# Patient Record
Sex: MALE | Race: WHITE
[De-identification: names, ages, dates, MRNs, and addresses within clinical notes are randomized per-mention and may not be internally consistent; named-entity substitution may affect disease eponyms.]

---

## 2018-10-04 ENCOUNTER — HOSPITAL ENCOUNTER (OUTPATIENT)
Dept: HOSPITAL 92 - LABBT | Age: 53
Discharge: HOME | End: 2018-10-04
Attending: ORTHOPAEDIC SURGERY
Payer: COMMERCIAL

## 2018-10-04 DIAGNOSIS — G56.01: ICD-10-CM

## 2018-10-04 DIAGNOSIS — G56.21: ICD-10-CM

## 2018-10-04 DIAGNOSIS — Z01.818: Primary | ICD-10-CM

## 2018-10-04 LAB
ANION GAP SERPL CALC-SCNC: 12 MMOL/L (ref 10–20)
BASOPHILS # BLD AUTO: 0.1 THOU/UL (ref 0–0.2)
BASOPHILS NFR BLD AUTO: 0.9 % (ref 0–1)
BUN SERPL-MCNC: 21 MG/DL (ref 8.4–25.7)
CALCIUM SERPL-MCNC: 10 MG/DL (ref 7.8–10.44)
CHLORIDE SERPL-SCNC: 104 MMOL/L (ref 98–107)
CO2 SERPL-SCNC: 26 MMOL/L (ref 22–29)
CREAT CL PREDICTED SERPL C-G-VRATE: 0 ML/MIN (ref 70–130)
EOSINOPHIL # BLD AUTO: 0.3 THOU/UL (ref 0–0.7)
EOSINOPHIL NFR BLD AUTO: 4.4 % (ref 0–10)
GLUCOSE SERPL-MCNC: 103 MG/DL (ref 70–105)
HGB BLD-MCNC: 17.4 G/DL (ref 14–18)
LYMPHOCYTES # BLD: 2.4 THOU/UL (ref 1.2–3.4)
LYMPHOCYTES NFR BLD AUTO: 30.6 % (ref 21–51)
MCH RBC QN AUTO: 29.8 PG (ref 27–31)
MCV RBC AUTO: 90.9 FL (ref 78–98)
MONOCYTES # BLD AUTO: 0.8 THOU/UL (ref 0.11–0.59)
MONOCYTES NFR BLD AUTO: 9.8 % (ref 0–10)
NEUTROPHILS # BLD AUTO: 4.3 THOU/UL (ref 1.4–6.5)
NEUTROPHILS NFR BLD AUTO: 54.2 % (ref 42–75)
PLATELET # BLD AUTO: 219 THOU/UL (ref 130–400)
POTASSIUM SERPL-SCNC: 3.8 MMOL/L (ref 3.5–5.1)
RBC # BLD AUTO: 5.83 MILL/UL (ref 4.7–6.1)
SODIUM SERPL-SCNC: 138 MMOL/L (ref 136–145)
WBC # BLD AUTO: 7.8 THOU/UL (ref 4.8–10.8)

## 2018-10-04 PROCEDURE — 85025 COMPLETE CBC W/AUTO DIFF WBC: CPT

## 2018-10-04 PROCEDURE — 93010 ELECTROCARDIOGRAM REPORT: CPT

## 2018-10-04 PROCEDURE — 93005 ELECTROCARDIOGRAM TRACING: CPT

## 2018-10-04 PROCEDURE — 80048 BASIC METABOLIC PNL TOTAL CA: CPT

## 2018-10-05 ENCOUNTER — HOSPITAL ENCOUNTER (OUTPATIENT)
Dept: HOSPITAL 92 - SDC | Age: 53
End: 2018-10-05
Attending: ORTHOPAEDIC SURGERY
Payer: COMMERCIAL

## 2018-10-05 VITALS — BODY MASS INDEX: 27.3 KG/M2

## 2018-10-05 DIAGNOSIS — K21.9: ICD-10-CM

## 2018-10-05 DIAGNOSIS — G56.01: ICD-10-CM

## 2018-10-05 DIAGNOSIS — G56.21: Primary | ICD-10-CM

## 2018-10-05 DIAGNOSIS — Z79.899: ICD-10-CM

## 2018-10-05 DIAGNOSIS — F17.200: ICD-10-CM

## 2018-10-05 PROCEDURE — 01N40ZZ RELEASE ULNAR NERVE, OPEN APPROACH: ICD-10-PCS | Performed by: ORTHOPAEDIC SURGERY

## 2018-10-05 PROCEDURE — 01N50ZZ RELEASE MEDIAN NERVE, OPEN APPROACH: ICD-10-PCS | Performed by: ORTHOPAEDIC SURGERY

## 2018-10-05 NOTE — OP
DATE OF PROCEDURE:  10/05/2018

 

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

 

PROCEDURES PERFORMED:

1.  Open carpal tunnel release.

2.  Open ulnar nerve release and transposition.

3.  Placement of long arm splint to the right upper extremity.

 

SURGEON:  Ari Gibbons M.D.

 

ASSISTANT:  None.

 

BLOOD LOSS:  Minimal.

 

COMPLICATIONS:  None.

 

ANESTHETIC:  The patient did have a general anesthetic.

 

DISPOSITION:  He did go to the recovery in stable condition.  There were no implants.

 

INDICATIONS:  This is a 53-year-old male who has moderate to severe carpal and cubital tunnel syndrom
es based on his EMG NCV as well as his presentation.  At this time, he opted for surgical release.

 

DESCRIPTION OF PROCEDURE:  After appropriate consent forms were explained and signed, he was taken ba
ck to the operating room and at this time was given general anesthetic.  Once anesthesia was appropri
ate, a tourniquet was placed up as high as possible on the right arm and the right arm was then prepp
ed and draped in the standard surgical fashion.  At this time, the right upper extremity was prepped 
and draped in the standard surgical fashion.  The carpal tunnel incision was drawn out and infiltrate
d with 1% plain lidocaine.  We then exsanguinated the limb and took the tourniquet up to 250 mmHg.  L
oupe magnification was used and a 15 blade was used to incise down through skin.  Bipolar cautery was
 used to coagulate any brisk venous bleeding.  Hemostat was placed underneath the transverse carpal l
igament to protect the underlying median nerve and a combination of 15 blade and scissors were used t
o transect the transverse carpal ligament.  The nerve was evaluated and found to be intact.  The unde
rlying tendons were intact.  No masses were noted.  At this time, we placed a moist Ray-Leonor sponge in
to the wound.  Hemostasis was then achieved.  Once the tourniquet was let down, we then thoroughly ir
rigated and dried.  Multiple nylon interrupted sutures were used to close the incision and a bulky st
erile soft tissue dressing was applied at this time.  We then went ahead and covered this up.  We the
n terrance out the incision line for the cubital tunnel release.  We then exsanguinated the limb one more
 time and at this time again with loupe magnification, 10 blade was used to incise down through skin.
  The bipolar cautery was used to coagulate any brisk venous bleeding.  We then found the ulnar nerve
 and released it from the cubital tunnel proximally into the brachium as well as taking it down dista
lly to the first motor branch.  All bleeding was coagulated with the bipolar cautery and the tissue w
as removed off the flexor pronator mass so that we could later position nerve and place and close the
 cubital tunnel.  Once the nerve had been adequately released, moist Ray-Leonor sponge was placed into t
he wound.  Tourniquet again was let down.  We then again achieved hemostasis using the bipolar cauter
y.  We then thoroughly irrigated and dried the wound.  We then used multiple interrupted large Vicryl
 sutures to close our cubital tunnel so the nerve could not fall back into this region.  We then used
 2-0 Vicryl and nylon sutures to close the skin.  Bulky sterile dressing was applied and at this time
 a long arm splint was placed posteriorly using Orthoglass.  The patient was then awakened.  He was t
aken to the recovery room in stable condition.  All counts were correct at the end of the case and he
 did receive preoperative IV antibiotics.

## 2018-10-07 NOTE — EKG
Test Reason : 

Blood Pressure : ***/*** mmHG

Vent. Rate : 070 BPM     Atrial Rate : 070 BPM

   P-R Int : 116 ms          QRS Dur : 088 ms

    QT Int : 360 ms       P-R-T Axes : 043 069 039 degrees

   QTc Int : 388 ms

 

Normal sinus rhythm with sinus arrhythmia

Normal ECG

No previous ECGs available

Confirmed by BLANCA MENDOZA ([id number]) on 10/7/2018 8:56:24 PM

 

Referred By:  IERO           Confirmed By:BLANCA MENDOZA